# Patient Record
Sex: FEMALE | Race: BLACK OR AFRICAN AMERICAN
[De-identification: names, ages, dates, MRNs, and addresses within clinical notes are randomized per-mention and may not be internally consistent; named-entity substitution may affect disease eponyms.]

---

## 2019-02-26 ENCOUNTER — HOSPITAL ENCOUNTER (OUTPATIENT)
Dept: HOSPITAL 74 - JRADIR | Age: 64
Discharge: HOME | End: 2019-02-26
Attending: SPECIALIST
Payer: COMMERCIAL

## 2019-02-26 DIAGNOSIS — E04.1: Primary | ICD-10-CM

## 2019-02-26 PROCEDURE — BG44ZZZ ULTRASONOGRAPHY OF THYROID GLAND: ICD-10-PCS | Performed by: RADIOLOGY

## 2019-02-26 PROCEDURE — 0G9H3ZX DRAINAGE OF RIGHT THYROID GLAND LOBE, PERCUTANEOUS APPROACH, DIAGNOSTIC: ICD-10-PCS | Performed by: RADIOLOGY

## 2019-02-27 NOTE — PATH
Cytology Non-Gynecological Report



Patient Name:  PAIGE DAVENPORT

Accession #:  [id number]

Med. Rec. #:  [id number]                                                        

   /Age/Gender:  1955 (Age: 63) / F

Account:  [id number]                                                          

             Location: [facility]

Taken:  2019

Received:  2019

Reported:  2019

Physicians:  CAREN Burnett M.D.

  



Specimen(s) Received

 THYROID, LEFT, FINE NEEDLE ASPIRATION 





Clinical History

Left thyroid nodule, 2.01 x 1.47 x 1.38 cm







Final Diagnosis

THYROID, LEFT, FINE NEEDLE ASPIRATION:

SATISFACTORY FOR EVALUATION. 

BETHESDA III: ATYPIA OF UNDERTERMINED SIGNIFICANCE/FOLLICULAR LESION OF

UNDETERMINED SIGNIFICANCE.

RARE CLUSTER OF ATYPICAL FOLLICULAR CELLS WITH MILD NUCLEAR ENLARGEMENT IN A

BACKGROUND OF RARE LYMPHOCYTES. FINDING ARE BEST REPRESENTED IN THE CELL BLOCK

MATERIAL; WHEREIN THE SPECIMEN IS PREDOMINANTLY COMPRISED BY AGGREGATES AND

CLUSTERS OF HURTHLE CELLS, SOME COLLOID, AND FEW LYMPHOCYTES.

 

Comment: The presence of lymphocytes raises the possibility of a Hurthle cell

nodule in the setting of Chronic lymphocytic thyroiditis; however a more

significant follicular lesion cannot be completely ruled out in this material.

Suggest clinical, radiologic, serologic correlation and repeat sampling after an

appropriate interval with material for molecular studies (Thyroseq). Findings

discussed with Dr. Wayne.  





***Electronically Signed***

Lala Katz M.D.





Gross Description

Received are eight direct smears, four of which are air-dried and Diff-Quik

stained, and four of which are alcohol fixed and Pap stained.  Also received is

20 ml of bloody formalin from which one cellblock is prepared.

## 2021-12-24 ENCOUNTER — HOSPITAL ENCOUNTER (EMERGENCY)
Dept: HOSPITAL 74 - JER | Age: 66
Discharge: HOME | End: 2021-12-24
Payer: COMMERCIAL

## 2021-12-24 VITALS — DIASTOLIC BLOOD PRESSURE: 72 MMHG | HEART RATE: 61 BPM | SYSTOLIC BLOOD PRESSURE: 113 MMHG | TEMPERATURE: 100.9 F

## 2021-12-24 VITALS — BODY MASS INDEX: 25.2 KG/M2

## 2021-12-24 DIAGNOSIS — R50.9: ICD-10-CM

## 2021-12-24 DIAGNOSIS — U07.1: Primary | ICD-10-CM

## 2021-12-24 DIAGNOSIS — R05.1: ICD-10-CM

## 2021-12-24 DIAGNOSIS — R09.81: ICD-10-CM

## 2021-12-24 PROCEDURE — U0005 INFEC AGEN DETEC AMPLI PROBE: HCPCS

## 2021-12-24 PROCEDURE — C9803 HOPD COVID-19 SPEC COLLECT: HCPCS

## 2021-12-24 PROCEDURE — U0003 INFECTIOUS AGENT DETECTION BY NUCLEIC ACID (DNA OR RNA); SEVERE ACUTE RESPIRATORY SYNDROME CORONAVIRUS 2 (SARS-COV-2) (CORONAVIRUS DISEASE [COVID-19]), AMPLIFIED PROBE TECHNIQUE, MAKING USE OF HIGH THROUGHPUT TECHNOLOGIES AS DESCRIBED BY CMS-2020-01-R: HCPCS
